# Patient Record
Sex: FEMALE | Race: WHITE | NOT HISPANIC OR LATINO | Employment: FULL TIME | URBAN - METROPOLITAN AREA
[De-identification: names, ages, dates, MRNs, and addresses within clinical notes are randomized per-mention and may not be internally consistent; named-entity substitution may affect disease eponyms.]

---

## 2017-01-16 ENCOUNTER — OFFICE VISIT (OUTPATIENT)
Dept: OCCUPATIONAL MEDICINE | Facility: CLINIC | Age: 27
End: 2017-01-16

## 2017-01-16 ENCOUNTER — NON-PROVIDER VISIT (OUTPATIENT)
Dept: OCCUPATIONAL MEDICINE | Facility: CLINIC | Age: 27
End: 2017-01-16

## 2017-01-16 DIAGNOSIS — Z01.89 RESPIRATORY CLEARANCE EXAMINATION, ENCOUNTER FOR: ICD-10-CM

## 2017-01-16 DIAGNOSIS — Z02.1 PRE-EMPLOYMENT DRUG SCREENING: ICD-10-CM

## 2017-01-16 LAB
AMP AMPHETAMINE: NORMAL
BAR BARBITURATES: NORMAL
BZO BENZODIAZEPINES: NORMAL
COC COCAINE: NORMAL
INT CON NEG: NORMAL
INT CON POS: NORMAL
MDMA ECSTASY: NORMAL
MET METHAMPHETAMINES: NORMAL
MTD METHADONE: NORMAL
OPI OPIATES: NORMAL
OXY OXYCODONE: NORMAL
PCP PHENCYCLIDINE: NORMAL
POC URINE DRUG SCREEN OCDRS: NEGATIVE
THC: NORMAL

## 2017-01-16 PROCEDURE — 99202 OFFICE O/P NEW SF 15 MIN: CPT | Performed by: PREVENTIVE MEDICINE

## 2017-01-16 PROCEDURE — 80305 DRUG TEST PRSMV DIR OPT OBS: CPT | Performed by: PREVENTIVE MEDICINE

## 2017-01-16 PROCEDURE — 94375 RESPIRATORY FLOW VOLUME LOOP: CPT | Performed by: PREVENTIVE MEDICINE

## 2017-01-16 NOTE — MR AVS SNAPSHOT
Brittani Pan   2017 8:00 AM   Appointment   MRN: [id number]    Department:  [facility]   Dept Phone:  [phone]    Description:  Female : 1990   Provider:  [facility] HEALTH LANRE MA, R.N.           Allergies as of 2017     Not on File      Basic Information     Date Of Birth Sex Race Ethnicity Preferred Language    1990 Female White Non- English      Health Maintenance     Patient has no pending health maintenance at this time      Current Immunizations     No immunizations on file.      Below and/or attached are the medications your provider expects you to take. Review all of your home medications and newly ordered medications with your provider and/or pharmacist. Follow medication instructions as directed by your provider and/or pharmacist. Please keep your medication list with you and share with your provider. Update the information when medications are discontinued, doses are changed, or new medications (including over-the-counter products) are added; and carry medication information at all times in the event of emergency situations     Allergies:  (Not on file)          Medications  Valid as of: 2017 - 11:43 AM    Generic Name Brand Name Tablet Size Instructions for use    .                 Medicines prescribed today were sent to:     None      Medication refill instructions:       If your prescription bottle indicates you have medication refills left, it is not necessary to call your provider’s office. Please contact your pharmacy and they will refill your medication.    If your prescription bottle indicates you do not have any refills left, you may request refills at any time through one of the following ways: The online Widemile system (except Urgent Care), by calling your provider’s office, or by asking your pharmacy to contact your provider’s office with a refill request. Medication refills are processed only during regular business hours and may not  be available until the next business day. Your provider may request additional information or to have a follow-up visit with you prior to refilling your medication.   *Please Note: Medication refills are assigned a new Rx number when refilled electronically. Your pharmacy may indicate that no refills were authorized even though a new prescription for the same medication is available at the pharmacy. Please request the medicine by name with the pharmacy before contacting your provider for a refill.           Simplilearn Access Code: [email or web address]  Expires: 2/11/2017  4:09 PM    Simplilearn  A secure, online tool to manage your health information     [employer]’s Simplilearn® is a secure, online tool that connects you to your personalized health information from the privacy of your home -- day or night - making it very easy for you to manage your healthcare. Once the activation process is completed, you can even access your medical information using the Simplilearn jennifer, which is available for free in the Apple Jennifer store or Google Play store.     Simplilearn provides the following levels of access (as shown below):   My Chart Features   [facility] Doctor [facility]  Specialists [facility]  Urgent  Care Non-[facility]  Primary Care  Doctor   Email your healthcare team securely and privately 24/7 X X X    Manage appointments: schedule your next appointment; view details of past/upcoming appointments X      Request prescription refills. X      View recent personal medical records, including lab and immunizations X X X X   View health record, including health history, allergies, medications X X X X   Read reports about your outpatient visits, procedures, consult and ER notes X X X X   See your discharge summary, which is a recap of your hospital and/or ER visit that includes your diagnosis, lab results, and care plan. X X       How to register for Simplilearn:  1. Go to  [email or web address].  2. Click on the Sign Up Now box, which  takes you to the New Member Sign Up page. You will need to provide the following information:  a. Enter your BOOK A TIGER Access Code exactly as it appears at the top of this page. (You will not need to use this code after you’ve completed the sign-up process. If you do not sign up before the expiration date, you must request a new code.)   b. Enter your date of birth.   c. Enter your home email address.   d. Click Submit, and follow the next screen’s instructions.  3. Create a BOOK A TIGER ID. This will be your BOOK A TIGER login ID and cannot be changed, so think of one that is secure and easy to remember.  4. Create a BOOK A TIGER password. You can change your password at any time.  5. Enter your Password Reset Question and Answer. This can be used at a later time if you forget your password.   6. Enter your e-mail address. This allows you to receive e-mail notifications when new information is available in BOOK A TIGER.  7. Click Sign Up. You can now view your health information.    For assistance activating your BOOK A TIGER account, call [phone]

## 2017-01-16 NOTE — MR AVS SNAPSHOT
Brittani Pan   2017 8:20 AM   Appointment   MRN: [id number]    Department:  [facility]   Dept Phone:  [phone]    Description:  Female : 1990   Provider:  Say Tay D.O.           Allergies as of 2017     Not on File      Basic Information     Date Of Birth Sex Race Ethnicity Preferred Language    1990 Female White Non- English      Health Maintenance     Patient has no pending health maintenance at this time      Current Immunizations     No immunizations on file.      Below and/or attached are the medications your provider expects you to take. Review all of your home medications and newly ordered medications with your provider and/or pharmacist. Follow medication instructions as directed by your provider and/or pharmacist. Please keep your medication list with you and share with your provider. Update the information when medications are discontinued, doses are changed, or new medications (including over-the-counter products) are added; and carry medication information at all times in the event of emergency situations     Allergies:  (Not on file)          Medications  Valid as of: 2017 - 11:48 AM    Generic Name Brand Name Tablet Size Instructions for use    .                 Medicines prescribed today were sent to:     None      Medication refill instructions:       If your prescription bottle indicates you have medication refills left, it is not necessary to call your provider’s office. Please contact your pharmacy and they will refill your medication.    If your prescription bottle indicates you do not have any refills left, you may request refills at any time through one of the following ways: The online PromptCare system (except Urgent Care), by calling your provider’s office, or by asking your pharmacy to contact your provider’s office with a refill request. Medication refills are processed only during regular business hours and may not be  available until the next business day. Your provider may request additional information or to have a follow-up visit with you prior to refilling your medication.   *Please Note: Medication refills are assigned a new Rx number when refilled electronically. Your pharmacy may indicate that no refills were authorized even though a new prescription for the same medication is available at the pharmacy. Please request the medicine by name with the pharmacy before contacting your provider for a refill.           Dealflow.com Access Code: [email or web address]  Expires: 2/11/2017  4:09 PM    Dealflow.com  A secure, online tool to manage your health information     [employer]’s Dealflow.com® is a secure, online tool that connects you to your personalized health information from the privacy of your home -- day or night - making it very easy for you to manage your healthcare. Once the activation process is completed, you can even access your medical information using the Dealflow.com jennifer, which is available for free in the Apple Jennifer store or Google Play store.     Dealflow.com provides the following levels of access (as shown below):   My Chart Features   [facility]  Specialists [facility]  Urgent  Care Non-[facility]  Primary Care  Doctor   Email your healthcare team securely and privately 24/7 X X X    Manage appointments: schedule your next appointment; view details of past/upcoming appointments X      Request prescription refills. X      View recent personal medical records, including lab and immunizations X X X X   View health record, including health history, allergies, medications X X X X   Read reports about your outpatient visits, procedures, consult and ER notes X X X X   See your discharge summary, which is a recap of your hospital and/or ER visit that includes your diagnosis, lab results, and care plan. X X       How to register for [email or web address]:  1. Go to  [email or web address].  2. Click on the Sign Up Now box, which takes  you to the New Member Sign Up page. You will need to provide the following information:  a. Enter your Grasswire Access Code exactly as it appears at the top of this page. (You will not need to use this code after you’ve completed the sign-up process. If you do not sign up before the expiration date, you must request a new code.)   b. Enter your date of birth.   c. Enter your home email address.   d. Click Submit, and follow the next screen’s instructions.  3. Create a Grasswire ID. This will be your Grasswire login ID and cannot be changed, so think of one that is secure and easy to remember.  4. Create a Grasswire password. You can change your password at any time.  5. Enter your Password Reset Question and Answer. This can be used at a later time if you forget your password.   6. Enter your e-mail address. This allows you to receive e-mail notifications when new information is available in Grasswire.  7. Click Sign Up. You can now view your health information.    For assistance activating your Grasswire account, call [phone]

## 2017-05-28 ENCOUNTER — OFFICE VISIT (OUTPATIENT)
Dept: URGENT CARE | Facility: CLINIC | Age: 27
End: 2017-05-28
Payer: COMMERCIAL

## 2017-05-28 VITALS
RESPIRATION RATE: 16 BRPM | DIASTOLIC BLOOD PRESSURE: 76 MMHG | HEART RATE: 120 BPM | SYSTOLIC BLOOD PRESSURE: 104 MMHG | OXYGEN SATURATION: 97 % | TEMPERATURE: 100 F

## 2017-05-28 DIAGNOSIS — J03.90 ACUTE TONSILLITIS, UNSPECIFIED ETIOLOGY: ICD-10-CM

## 2017-05-28 DIAGNOSIS — J02.9 PHARYNGITIS, UNSPECIFIED ETIOLOGY: ICD-10-CM

## 2017-05-28 DIAGNOSIS — R11.2 NON-INTRACTABLE VOMITING WITH NAUSEA, UNSPECIFIED VOMITING TYPE: ICD-10-CM

## 2017-05-28 DIAGNOSIS — R19.7 DIARRHEA, UNSPECIFIED TYPE: ICD-10-CM

## 2017-05-28 DIAGNOSIS — J02.9 SORE THROAT: ICD-10-CM

## 2017-05-28 DIAGNOSIS — R50.9 FEVER, UNSPECIFIED FEVER CAUSE: ICD-10-CM

## 2017-05-28 LAB
FLUAV+FLUBV AG SPEC QL IA: NEGATIVE
INT CON NEG: NEGATIVE
INT CON NEG: NEGATIVE
INT CON POS: POSITIVE
INT CON POS: POSITIVE
S PYO AG THROAT QL: NEGATIVE

## 2017-05-28 PROCEDURE — 87804 INFLUENZA ASSAY W/OPTIC: CPT | Performed by: NURSE PRACTITIONER

## 2017-05-28 PROCEDURE — 87880 STREP A ASSAY W/OPTIC: CPT | Performed by: NURSE PRACTITIONER

## 2017-05-28 PROCEDURE — 99204 OFFICE O/P NEW MOD 45 MIN: CPT | Performed by: NURSE PRACTITIONER

## 2017-05-28 RX ORDER — ONDANSETRON 4 MG/1
4 TABLET, ORALLY DISINTEGRATING ORAL EVERY 8 HOURS PRN
Qty: 12 TAB | Refills: 0 | Status: SHIPPED | OUTPATIENT
Start: 2017-05-28

## 2017-05-28 RX ORDER — DICYCLOMINE HCL 20 MG
20 TABLET ORAL EVERY 6 HOURS
Qty: 16 TAB | Refills: 0 | Status: CANCELLED | OUTPATIENT
Start: 2017-05-28 | End: 2017-06-01

## 2017-05-28 RX ORDER — AZITHROMYCIN 250 MG/1
TABLET, FILM COATED ORAL
Qty: 6 TAB | Refills: 0 | Status: SHIPPED | OUTPATIENT
Start: 2017-05-28

## 2017-05-28 RX ORDER — LOPERAMIDE HYDROCHLORIDE 2 MG/1
2 CAPSULE ORAL 4 TIMES DAILY PRN
Qty: 30 CAP | Refills: 0 | Status: SHIPPED | OUTPATIENT
Start: 2017-05-28

## 2017-05-28 NOTE — MR AVS SNAPSHOT
Brittani Pan   2017 4:15 PM   Office Visit   MRN: [id number]    Department:  [facility]   Dept Phone:  [phone]    Description:  Female : 1990   Provider:  KIERRA Howe           Reason for Visit     Sore Throat nausea, vomiting &diarrhea x3 days      Allergies as of 2017     No Known Allergies      You were diagnosed with     Fever, unspecified fever cause   [0728694]       Sore throat   [699504]       Non-intractable vomiting with nausea, unspecified vomiting type   [9221161]       Diarrhea, unspecified type   [5390161]       Acute tonsillitis, unspecified etiology   [6050384]       Pharyngitis, unspecified etiology   [5326199]         Vital Signs     Blood Pressure Pulse Temperature Respirations Oxygen Saturation       104/76 mmHg 120 37.8 °C (100 °F) 16 97%       Basic Information     Date Of Birth Sex Race Ethnicity Preferred Language    1990 Female White Non- English      Health Maintenance        Date Due Completion Dates    IMM HEP A VACCINE (1 of 2 - Standard Series) 1991 ---    IMM HPV VACCINE (1 of 3 - Female 3 Dose Series) 2001 ---    IMM VARICELLA (CHICKENPOX) VACCINE (1 of 2 - 2 Dose Adolescent Series) 2003 ---    IMM DTaP/Tdap/Td Vaccine (1 - Tdap) 2009 ---    PAP SMEAR 2011 ---            Results     POCT Rapid Strep A      Component    Rapid Strep Screen    Negative    Internal Control Positive    Positive    Internal Control Negative    Negative                POCT Influenza A/B      Component    Rapid Influenza A-B    Negative    Internal Control Positive    Positive    Internal Control Negative    Negative                        Current Immunizations     Hepatitis B Vaccine Non-Recombivax (Ped/Adol) 1998, 1998, 1998    Influenza TIV (IM) 2016    MMR Vaccine 3/1/1996, 1992      Below and/or attached are the medications your provider expects you to take. Review all of your home  medications and newly ordered medications with your provider and/or pharmacist. Follow medication instructions as directed by your provider and/or pharmacist. Please keep your medication list with you and share with your provider. Update the information when medications are discontinued, doses are changed, or new medications (including over-the-counter products) are added; and carry medication information at all times in the event of emergency situations     Allergies:  No Known Allergies          Medications  Valid as of: May 28, 2017 -  5:09 PM    Generic Name Brand Name Tablet Size Instructions for use    Azithromycin (Tab) ZITHROMAX 250 MG Take 2 tabs by mouth on day 1, then take 1 tab on days 2-5        Loperamide HCl (Cap) IMODIUM 2 MG Take 1 Cap by mouth 4 times a day as needed for Diarrhea.        Ondansetron (TABLET DISPERSIBLE) ZOFRAN ODT 4 MG Take 1 Tab by mouth every 8 hours as needed for Nausea/Vomiting.        .                 Medicines prescribed today were sent to:     [facility] [address] [place] [address] & [address] [address]    Phone: [phone] Fax: [phone]    Open 24 Hours?: No      Medication refill instructions:       If your prescription bottle indicates you have medication refills left, it is not necessary to call your provider’s office. Please contact your pharmacy and they will refill your medication.    If your prescription bottle indicates you do not have any refills left, you may request refills at any time through one of the following ways: The online [employer] system (except Urgent Care), by calling your provider’s office, or by asking your pharmacy to contact your provider’s office with a refill request. Medication refills are processed only during regular business hours and may not be available until the next business day. Your provider may request additional information or to have a follow-up visit with you  prior to refilling your medication.   *Please Note: Medication refills are assigned a new Rx number when refilled electronically. Your pharmacy may indicate that no refills were authorized even though a new prescription for the same medication is available at the pharmacy. Please request the medicine by name with the pharmacy before contacting your provider for a refill.           Birdback Access Code: [email or web address]  Expires: 6/27/2017  4:07 PM    Birdback  A secure, online tool to manage your health information     [employer]’s Birdback® is a secure, online tool that connects you to your personalized health information from the privacy of your home -- day or night - making it very easy for you to manage your healthcare. Once the activation process is completed, you can even access your medical information using the Birdback jennifer, which is available for free in the Apple Jennifer store or Google Play store.     Birdback provides the following levels of access (as shown below):   My Chart Features   [facility] Primary Care Doctor [facility]  Specialists [facility]  Urgent  Care [facility]  Primary Care  Doctor   Email your healthcare team securely and privately 24/7 X X X    Manage appointments: schedule your next appointment; view details of past/upcoming appointments X      Request prescription refills. X      View recent personal medical records, including lab and immunizations X X X X   View health record, including health history, allergies, medications X X X X   Read reports about your outpatient visits, procedures, consult and ER notes X X X X   See your discharge summary, which is a recap of your hospital and/or ER visit that includes your diagnosis, lab results, and care plan. X X       How to register for Birdback:  1. Go to  [email or web address].  2. Click on the Sign Up Now box, which takes you to the New Member Sign Up page. You will need to provide the following information:  a. Enter your Birdback Access Code  exactly as it appears at the top of this page. (You will not need to use this code after you’ve completed the sign-up process. If you do not sign up before the expiration date, you must request a new code.)   b. Enter your date of birth.   c. Enter your home email address.   d. Click Submit, and follow the next screen’s instructions.  3. Create a logtrust ID. This will be your logtrust login ID and cannot be changed, so think of one that is secure and easy to remember.  4. Create a logtrust password. You can change your password at any time.  5. Enter your Password Reset Question and Answer. This can be used at a later time if you forget your password.   6. Enter your e-mail address. This allows you to receive e-mail notifications when new information is available in logtrust.  7. Click Sign Up. You can now view your health information.    For assistance activating your logtrust account, call [phone]

## 2017-05-29 ASSESSMENT — ENCOUNTER SYMPTOMS
EYE DISCHARGE: 0
SORE THROAT: 1
NAUSEA: 1
HEADACHES: 0
ABDOMINAL PAIN: 0
VOMITING: 1
CHILLS: 0
EYE REDNESS: 0
MYALGIAS: 0
BACK PAIN: 0
COUGH: 0
FEVER: 0
DIZZINESS: 0
DIARRHEA: 1
WEAKNESS: 0
SHORTNESS OF BREATH: 0
CONSTIPATION: 0

## 2017-05-29 NOTE — PROGRESS NOTES
Subjective:      Brittani Pan is a 26 y.o. female who presents with Sore Throat            HPI  Brittani is a 26 year old female who is here for sore throat x 3 days and n/v/d  Since last night. 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 Denies fever and chills last night. Unknown temp, no thermometer. States nausea and vomited 1x last night. Has had 4 bouts of diarrhea today. States has eaten cereal and cheesecake this AM which made diarrhea worse. Has not taken any OTC med for symptoms but is able to retain some fluids. Denies problems with allergies. Denies sever abdominal cramping with diarrhea.    PMH:  has no past medical history on file.  MEDS:   Current outpatient prescriptions:   •  ondansetron (ZOFRAN ODT) 4 MG TABLET DISPERSIBLE, Take 1 Tab by mouth every 8 hours as needed for Nausea/Vomiting., Disp: 12 Tab, Rfl: 0  •  loperamide (IMODIUM) 2 MG Cap, Take 1 Cap by mouth 4 times a day as needed for Diarrhea., Disp: 30 Cap, Rfl: 0  •  azithromycin (ZITHROMAX) 250 MG Tab, Take 2 tabs by mouth on day 1, then take 1 tab on days 2-5, Disp: 6 Tab, Rfl: 0  ALLERGIES: No Known Allergies  SURGHX: History reviewed. No pertinent past surgical history.  SOCHX:    FH: Family history was reviewed, no pertinent findings to report    Review of Systems   Constitutional: Negative for fever, chills and malaise/fatigue.   HENT: Positive for sore throat. Negative for congestion and ear pain.    Eyes: Negative for discharge and redness.   Respiratory: Negative for cough and shortness of breath.    Gastrointestinal: Positive for nausea, vomiting and diarrhea. Negative for abdominal pain and constipation.   Musculoskeletal: Negative for myalgias and back pain.   Neurological: Negative for dizziness, weakness and headaches.   Endo/Heme/Allergies: Negative for environmental allergies.   All other systems reviewed and are negative.         Objective:     /76 mmHg  Pulse 120  Temp([facility])  37.8 °C (100 °F)  Resp 16  SpO2 97%     Physical Exam   Constitutional: She is oriented to person, place, and time. She appears well-developed and well-nourished. She is active and cooperative.  Non-toxic appearance. She does not have a sickly appearance. She appears ill. No distress.   HENT:   Head: Normocephalic.   Right Ear: Hearing, external ear and ear canal normal. A middle ear effusion is present.   Left Ear: Hearing, external ear and ear canal normal. A middle ear effusion is present.   Nose: Mucosal edema and rhinorrhea present. No sinus tenderness.   Mouth/Throat: Uvula is midline. Mucous membranes are dry. No uvula swelling. Posterior oropharyngeal edema and posterior oropharyngeal erythema present. No oropharyngeal exudate or tonsillar abscesses.   Left side tonsil inflamed and swollen   Eyes: Conjunctivae and EOM are normal. Pupils are equal, round, and reactive to light.   Neck: Normal range of motion. Neck supple.   Cardiovascular: Normal rate.    Pulmonary/Chest: Effort normal.   Abdominal: Soft. Bowel sounds are normal. She exhibits no distension. There is no tenderness. There is no rebound and no guarding.   Musculoskeletal: Normal range of motion.   Lymphadenopathy:     She has no cervical adenopathy.   Neurological: She is alert and oriented to person, place, and time.   Skin: Skin is warm and dry. She is not diaphoretic.   Vitals reviewed.              Assessment/Plan:     1. Fever, unspecified fever cause    - POCT Rapid Strep A: NEG  - POCT Influenza A/B: NEG    2. Sore throat    - POCT Rapid Strep A  - POCT Influenza A/B    3. Non-intractable vomiting with nausea, unspecified vomiting type    - POCT Influenza A/B  - ondansetron (ZOFRAN ODT) 4 MG TABLET DISPERSIBLE; Take 1 Tab by mouth every 8 hours as needed for Nausea/Vomiting.  Dispense: 12 Tab; Refill: 0    4. Diarrhea, unspecified type    - POCT Influenza A/B  - loperamide (IMODIUM) 2 MG Cap; Take 1 Cap by mouth 4 times a day as needed  for Diarrhea.  Dispense: 30 Cap; Refill: 0    5. Acute tonsillitis, unspecified etiology    - azithromycin (ZITHROMAX) 250 MG Tab; Take 2 tabs by mouth on day 1, then take 1 tab on days 2-5  Dispense: 6 Tab; Refill: 0    6. Pharyngitis, unspecified etiology    - azithromycin (ZITHROMAX) 250 MG Tab; Take 2 tabs by mouth on day 1, then take 1 tab on days 2-5  Dispense: 6 Tab; Refill: 0    May use Ibuprofen/Tylenol prn for any fever, body aches or throat pain  May take long acting antihistamine for seasonal allergy symptoms prn  May use saline nasal spray/pallavi pot for nasal congestion prn  May use Nasacort prn for nasal congestion  May use throat lozenges for throat discomfort  May gargle with salt water prn for throat discomfort  Monitor for continued fever with treatment, any sinus pain/pressure with sinus congestion with thick mucus production and HA- need re-evaluation  Monitor for productive cough, SOB and chest pain/tightness, fever- need re-evaluation    Maintain water status slowly with sips of water and electrolyte fluid, increase to larger amounts as tolerated  Introduce solid foods when diarrhea ceases and becomes firmer without abdominal cramping. Eat items from the BRAT diet- trying small amount with one item at a time  May use Lanolin, diaper rash ointment or Vaseline to soothe anus for raw skin  Monitor for fever, increased abdominal pain, n/v, lethargy, continued diarrhea- need re-evaluation